# Patient Record
Sex: FEMALE | Race: BLACK OR AFRICAN AMERICAN | NOT HISPANIC OR LATINO | RURAL
[De-identification: names, ages, dates, MRNs, and addresses within clinical notes are randomized per-mention and may not be internally consistent; named-entity substitution may affect disease eponyms.]

---
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
## 2022-05-12 ENCOUNTER — HOSPITAL ENCOUNTER (EMERGENCY)
Facility: HOSPITAL | Age: 22
Discharge: HOME | End: 2022-05-12
Payer: MEDICAID

## 2022-05-12 VITALS
WEIGHT: 234 LBS | RESPIRATION RATE: 17 BRPM | TEMPERATURE: 98 F | BODY MASS INDEX: 36.73 KG/M2 | HEART RATE: 58 BPM | OXYGEN SATURATION: 98 % | DIASTOLIC BLOOD PRESSURE: 58 MMHG | SYSTOLIC BLOOD PRESSURE: 123 MMHG | HEIGHT: 67 IN

## 2022-05-12 DIAGNOSIS — H60.501 ACUTE OTITIS EXTERNA OF RIGHT EAR, UNSPECIFIED TYPE: Primary | ICD-10-CM

## 2022-05-12 PROCEDURE — 99284 EMERGENCY DEPT VISIT MOD MDM: CPT

## 2022-05-12 PROCEDURE — 99283 PR EMERGENCY DEPT VISIT,LEVEL III: ICD-10-PCS | Mod: ,,, | Performed by: PHYSICIAN ASSISTANT

## 2022-05-12 PROCEDURE — 25000003 PHARM REV CODE 250: Performed by: PHYSICIAN ASSISTANT

## 2022-05-12 PROCEDURE — 99283 EMERGENCY DEPT VISIT LOW MDM: CPT | Mod: ,,, | Performed by: PHYSICIAN ASSISTANT

## 2022-05-12 RX ORDER — NEOMYCIN SULFATE, POLYMYXIN B SULFATE AND HYDROCORTISONE 10; 3.5; 1 MG/ML; MG/ML; [USP'U]/ML
4 SUSPENSION/ DROPS AURICULAR (OTIC) 3 TIMES DAILY
Qty: 10 ML | Refills: 1 | Status: SHIPPED | OUTPATIENT
Start: 2022-05-12 | End: 2022-12-28

## 2022-05-12 RX ORDER — ACETAMINOPHEN 500 MG
1000 TABLET ORAL
Status: COMPLETED | OUTPATIENT
Start: 2022-05-12 | End: 2022-05-12

## 2022-05-12 RX ORDER — NEOMYCIN SULFATE, POLYMYXIN B SULFATE, HYDROCORTISONE 3.5; 10000; 1 MG/ML; [USP'U]/ML; MG/ML
4 SOLUTION/ DROPS AURICULAR (OTIC)
Status: COMPLETED | OUTPATIENT
Start: 2022-05-12 | End: 2022-05-12

## 2022-05-12 RX ADMIN — NEOMYCIN SULFATE, POLYMYXIN B SULFATE, HYDROCORTISONE 4 DROP: 3.5; 10000; 1 SOLUTION/ DROPS AURICULAR (OTIC) at 05:05

## 2022-05-12 RX ADMIN — ACETAMINOPHEN 1000 MG: 500 TABLET, FILM COATED ORAL at 05:05

## 2022-05-12 NOTE — ED PROVIDER NOTES
Encounter Date: 5/12/2022       History     Chief Complaint   Patient presents with    Otalgia    Foreign Body in Ear     Started 30 minutes ago.     21-year-old female with history of right ear pain, she also states that her ear ground her ear hurts as well.  She denies any spinning, but has been taking extended baths.  She states she maybe pregnant.        Review of patient's allergies indicates:   Allergen Reactions    Hornet venom Swelling     History reviewed. No pertinent past medical history.  Past Surgical History:   Procedure Laterality Date    KNEE SURGERY Right      History reviewed. No pertinent family history.  Social History     Tobacco Use    Smoking status: Former Smoker    Smokeless tobacco: Never Used   Substance Use Topics    Alcohol use: Not Currently    Drug use: Not Currently     Types: Marijuana     Review of Systems   HENT: Positive for ear pain.    All other systems reviewed and are negative.      Physical Exam     Initial Vitals [05/12/22 0447]   BP Pulse Resp Temp SpO2   (!) 123/58 (!) 58 17 98.2 °F (36.8 °C) 98 %      MAP       --         Physical Exam    Nursing note and vitals reviewed.  Constitutional:   Obese   HENT:   Head: Normocephalic and atraumatic.   Nose: Nose normal.   Mouth/Throat: Oropharynx is clear and moist.   Ear canal is erythemic with maceration of the canal, it is not closed.  She also has tenderness when pulling on the external ear, and with pressure on the tragus.   Eyes: EOM are normal.   Neck:   Normal range of motion.  Cardiovascular: Normal rate and regular rhythm.   Pulmonary/Chest: Breath sounds normal.   Musculoskeletal:      Cervical back: Normal range of motion.     Neurological: She is alert and oriented to person, place, and time.   Skin: Skin is dry.   Psychiatric: She has a normal mood and affect.         Medical Screening Exam   See Full Note    ED Course   Procedures  Labs Reviewed - No data to display       Imaging Results    None           Medications   acetaminophen tablet 1,000 mg (1,000 mg Oral Given 5/12/22 0525)   neomycin-polymyxin-hydrocortisone otic solution 4 drop (4 drops Right Ear Given 5/12/22 0525)                 ED Course as of 05/12/22 0534   Thu May 12, 2022   0526 Patient has otitis externa, will be given ear drops, and acetaminophen. [CB]      ED Course User Index  [CB] CHARLA Cage          Clinical Impression:   Final diagnoses:  [H60.501] Acute otitis externa of right ear, unspecified type (Primary)          ED Disposition Condition    Discharge Stable        ED Prescriptions     Medication Sig Dispense Start Date End Date Auth. Provider    neomycin-polymyxin-hydrocortisone (CORTISPORIN) 3.5-10,000-1 mg/mL-unit/mL-% otic suspension Place 4 drops into the right ear 3 (three) times daily. 10 mL 5/12/2022  CHARLA Cage        Follow-up Information    None          CHARLA Cage  05/12/22 0534

## 2022-05-12 NOTE — DISCHARGE INSTRUCTIONS
Return to the ER if any new or worsening symptoms arise.  If no better in 2-3 days follow-up with your primary care provider.

## 2022-05-12 NOTE — Clinical Note
"Alistair Bellajose a Wang was seen and treated in our emergency department on 5/12/2022.  She may return to school on 05/13/2022.      If you have any questions or concerns, please don't hesitate to call.      CHARLA Cage" Yes

## 2022-05-12 NOTE — ED NOTES
Instructed patient to instill 4 drops in right ear 3 times daily using same procedure that we use to instill drops in her ear in the ed. Have prescription filled in a few days when current bottle is about to run out. May take 1000 mg of tylenol every 4-6 hours as needed for pain or fever.  Do not put anything in ears smaller than your elbow! Follow-up with pcp in 2-3 days if not better. Return to ed for any new or worsening symptoms.  Patient verbalized understanding of all instructions.

## 2022-05-16 ENCOUNTER — TELEPHONE (OUTPATIENT)
Dept: EMERGENCY MEDICINE | Facility: HOSPITAL | Age: 22
End: 2022-05-16
Payer: MEDICAID

## 2022-12-28 ENCOUNTER — OFFICE VISIT (OUTPATIENT)
Dept: FAMILY MEDICINE | Facility: CLINIC | Age: 22
End: 2022-12-28
Payer: MEDICAID

## 2022-12-28 VITALS
RESPIRATION RATE: 18 BRPM | OXYGEN SATURATION: 99 % | HEART RATE: 55 BPM | BODY MASS INDEX: 36.51 KG/M2 | WEIGHT: 232.63 LBS | DIASTOLIC BLOOD PRESSURE: 68 MMHG | SYSTOLIC BLOOD PRESSURE: 104 MMHG | TEMPERATURE: 97 F | HEIGHT: 67 IN

## 2022-12-28 DIAGNOSIS — Z32.02 ENCOUNTER FOR PREGNANCY TEST, RESULT NEGATIVE: Primary | ICD-10-CM

## 2022-12-28 DIAGNOSIS — R11.0 NAUSEA: ICD-10-CM

## 2022-12-28 LAB
B-HCG UR QL: NEGATIVE
CTP QC/QA: YES

## 2022-12-28 PROCEDURE — 3074F PR MOST RECENT SYSTOLIC BLOOD PRESSURE < 130 MM HG: ICD-10-PCS | Mod: CPTII,,, | Performed by: NURSE PRACTITIONER

## 2022-12-28 PROCEDURE — 1160F PR REVIEW ALL MEDS BY PRESCRIBER/CLIN PHARMACIST DOCUMENTED: ICD-10-PCS | Mod: CPTII,,, | Performed by: NURSE PRACTITIONER

## 2022-12-28 PROCEDURE — 3078F DIAST BP <80 MM HG: CPT | Mod: CPTII,,, | Performed by: NURSE PRACTITIONER

## 2022-12-28 PROCEDURE — 99212 PR OFFICE/OUTPT VISIT, EST, LEVL II, 10-19 MIN: ICD-10-PCS | Mod: ,,, | Performed by: NURSE PRACTITIONER

## 2022-12-28 PROCEDURE — 3008F PR BODY MASS INDEX (BMI) DOCUMENTED: ICD-10-PCS | Mod: CPTII,,, | Performed by: NURSE PRACTITIONER

## 2022-12-28 PROCEDURE — 99212 OFFICE O/P EST SF 10 MIN: CPT | Mod: ,,, | Performed by: NURSE PRACTITIONER

## 2022-12-28 PROCEDURE — 3078F PR MOST RECENT DIASTOLIC BLOOD PRESSURE < 80 MM HG: ICD-10-PCS | Mod: CPTII,,, | Performed by: NURSE PRACTITIONER

## 2022-12-28 PROCEDURE — 1159F MED LIST DOCD IN RCRD: CPT | Mod: CPTII,,, | Performed by: NURSE PRACTITIONER

## 2022-12-28 PROCEDURE — 1160F RVW MEDS BY RX/DR IN RCRD: CPT | Mod: CPTII,,, | Performed by: NURSE PRACTITIONER

## 2022-12-28 PROCEDURE — 3074F SYST BP LT 130 MM HG: CPT | Mod: CPTII,,, | Performed by: NURSE PRACTITIONER

## 2022-12-28 PROCEDURE — 1159F PR MEDICATION LIST DOCUMENTED IN MEDICAL RECORD: ICD-10-PCS | Mod: CPTII,,, | Performed by: NURSE PRACTITIONER

## 2022-12-28 PROCEDURE — 81025 URINE PREGNANCY TEST: CPT | Mod: RHCUB | Performed by: NURSE PRACTITIONER

## 2022-12-28 PROCEDURE — 3008F BODY MASS INDEX DOCD: CPT | Mod: CPTII,,, | Performed by: NURSE PRACTITIONER

## 2022-12-28 NOTE — PROGRESS NOTES
"Subjective:       Patient ID: Alistair Wang is a 22 y.o. female.    Chief Complaint: pregnant test (Pt states that she thinks she is pregnant)    Presents to clinic as above. LMP 12/10/22. Has had sore breasts. She is not on birth control.    Review of Systems   Constitutional: Negative.    Respiratory: Negative.     Cardiovascular: Negative.    Gastrointestinal:  Positive for nausea. Negative for abdominal pain and vomiting.   Genitourinary: Negative.         Reviewed family, medical, surgical, and social history.    Objective:      /68 (BP Location: Left arm, Patient Position: Sitting, BP Method: Large (Manual))   Pulse (!) 55   Temp 97.3 °F (36.3 °C) (Oral)   Resp 18   Ht 5' 7" (1.702 m)   Wt 105.5 kg (232 lb 9.6 oz)   SpO2 99%   BMI 36.43 kg/m²   Physical Exam  Vitals and nursing note reviewed.   Constitutional:       General: She is not in acute distress.     Appearance: Normal appearance. She is normal weight. She is not ill-appearing, toxic-appearing or diaphoretic.   Cardiovascular:      Rate and Rhythm: Normal rate and regular rhythm.      Heart sounds: Normal heart sounds.   Pulmonary:      Effort: Pulmonary effort is normal.      Breath sounds: Normal breath sounds.   Skin:     General: Skin is warm and dry.   Neurological:      Mental Status: She is alert and oriented to person, place, and time.   Psychiatric:         Mood and Affect: Mood normal.         Behavior: Behavior normal.         Thought Content: Thought content normal.         Judgment: Judgment normal.          Office Visit on 12/28/2022   Component Date Value Ref Range Status    POC Preg Test, Ur 12/28/2022 Negative  Negative Final     Acceptable 12/28/2022 Yes   Final      Assessment:       1. Encounter for pregnancy test, result negative    2. Nausea          Plan:       Encounter for pregnancy test, result negative    Nausea  -     POCT urine pregnancy    Urine HCG negative  Repeat test if misses " menses  RTC PRN          Risks, benefits, and side effects were discussed with the patient. All questions were answered to the fullest satisfaction of the patient, and pt verbalized understanding and agreement to treatment plan. Pt was to call with any new or worsening symptoms, or present to the ER.

## 2023-04-14 ENCOUNTER — HOSPITAL ENCOUNTER (EMERGENCY)
Facility: HOSPITAL | Age: 23
Discharge: HOME | End: 2023-04-14
Payer: MEDICAID

## 2023-04-14 VITALS
HEART RATE: 59 BPM | HEIGHT: 65 IN | TEMPERATURE: 98 F | OXYGEN SATURATION: 100 % | BODY MASS INDEX: 39.04 KG/M2 | RESPIRATION RATE: 18 BRPM | DIASTOLIC BLOOD PRESSURE: 65 MMHG | WEIGHT: 234.31 LBS | SYSTOLIC BLOOD PRESSURE: 107 MMHG

## 2023-04-14 DIAGNOSIS — R10.9 ABDOMINAL CRAMPING: ICD-10-CM

## 2023-04-14 DIAGNOSIS — N93.9 VAGINAL BLEEDING: Primary | ICD-10-CM

## 2023-04-14 LAB
ALBUMIN SERPL BCP-MCNC: 3.4 G/DL (ref 3.5–5)
ALBUMIN/GLOB SERPL: 0.8 {RATIO}
ALP SERPL-CCNC: 64 U/L (ref 52–144)
ALT SERPL W P-5'-P-CCNC: 28 U/L (ref 13–56)
ANION GAP SERPL CALCULATED.3IONS-SCNC: 10 MMOL/L (ref 7–16)
AST SERPL W P-5'-P-CCNC: 22 U/L (ref 15–37)
BACTERIA #/AREA URNS HPF: ABNORMAL /HPF
BASOPHILS # BLD AUTO: 0.01 K/UL (ref 0–0.2)
BASOPHILS NFR BLD AUTO: 0.1 % (ref 0–1)
BILIRUB SERPL-MCNC: 0.2 MG/DL (ref ?–1.2)
BILIRUB UR QL STRIP: NEGATIVE
BUN SERPL-MCNC: 14 MG/DL (ref 7–18)
BUN/CREAT SERPL: 16 (ref 6–20)
CALCIUM SERPL-MCNC: 8.7 MG/DL (ref 8.5–10.1)
CHLORIDE SERPL-SCNC: 102 MMOL/L (ref 98–107)
CLARITY UR: CLEAR
CO2 SERPL-SCNC: 29 MMOL/L (ref 21–32)
COLOR UR: YELLOW
CREAT SERPL-MCNC: 0.9 MG/DL (ref 0.55–1.02)
DIFFERENTIAL METHOD BLD: ABNORMAL
EGFR (NO RACE VARIABLE) (RUSH/TITUS): 94 ML/MIN/1.73M²
EOSINOPHIL # BLD AUTO: 0.08 K/UL (ref 0–0.5)
EOSINOPHIL NFR BLD AUTO: 0.9 % (ref 1–4)
ERYTHROCYTE [DISTWIDTH] IN BLOOD BY AUTOMATED COUNT: 13.6 % (ref 11.5–14.5)
GLOBULIN SER-MCNC: 4.4 G/DL (ref 2–4)
GLUCOSE SERPL-MCNC: 102 MG/DL (ref 74–106)
GLUCOSE UR STRIP-MCNC: NEGATIVE MG/DL
HCG SERPL-ACNC: <1 MIU/ML
HCG UR QL IA.RAPID: NEGATIVE
HCT VFR BLD AUTO: 36.5 % (ref 38–47)
HGB BLD-MCNC: 11.9 G/DL (ref 12–16)
KETONES UR STRIP-SCNC: NEGATIVE MG/DL
LEUKOCYTE ESTERASE UR QL STRIP: NEGATIVE
LYMPHOCYTES # BLD AUTO: 3.17 K/UL (ref 1–4.8)
LYMPHOCYTES NFR BLD AUTO: 35.4 % (ref 27–41)
MCH RBC QN AUTO: 28.7 PG (ref 27–31)
MCHC RBC AUTO-ENTMCNC: 32.6 G/DL (ref 32–36)
MCV RBC AUTO: 88.2 FL (ref 80–96)
MONOCYTES # BLD AUTO: 0.54 K/UL (ref 0–0.8)
MONOCYTES NFR BLD AUTO: 6 % (ref 2–6)
MPC BLD CALC-MCNC: 9.9 FL (ref 9.4–12.4)
NEUTROPHILS # BLD AUTO: 5.16 K/UL (ref 1.8–7.7)
NEUTROPHILS NFR BLD AUTO: 57.6 % (ref 53–65)
NITRITE UR QL STRIP: NEGATIVE
PH UR STRIP: 6 PH UNITS
PLATELET # BLD AUTO: 306 K/UL (ref 150–400)
POTASSIUM SERPL-SCNC: 3.8 MMOL/L (ref 3.5–5.1)
PROT SERPL-MCNC: 7.8 G/DL (ref 6.4–8.2)
PROT UR QL STRIP: NEGATIVE
RBC # BLD AUTO: 4.14 M/UL (ref 4.2–5.4)
RBC # UR STRIP: ABNORMAL /UL
RBC #/AREA URNS HPF: ABNORMAL /HPF
SODIUM SERPL-SCNC: 137 MMOL/L (ref 136–145)
SP GR UR STRIP: 1.02
SQUAMOUS #/AREA URNS LPF: ABNORMAL /LPF
UROBILINOGEN UR STRIP-ACNC: 0.2 MG/DL
WBC # BLD AUTO: 8.96 K/UL (ref 4.5–11)
WBC #/AREA URNS HPF: ABNORMAL /HPF

## 2023-04-14 PROCEDURE — 99283 EMERGENCY DEPT VISIT LOW MDM: CPT | Mod: ,,, | Performed by: NURSE PRACTITIONER

## 2023-04-14 PROCEDURE — 81001 URINALYSIS AUTO W/SCOPE: CPT | Performed by: NURSE PRACTITIONER

## 2023-04-14 PROCEDURE — 85025 COMPLETE CBC W/AUTO DIFF WBC: CPT | Performed by: NURSE PRACTITIONER

## 2023-04-14 PROCEDURE — 80053 COMPREHEN METABOLIC PANEL: CPT | Performed by: NURSE PRACTITIONER

## 2023-04-14 PROCEDURE — 99283 PR EMERGENCY DEPT VISIT,LEVEL III: ICD-10-PCS | Mod: ,,, | Performed by: NURSE PRACTITIONER

## 2023-04-14 PROCEDURE — 84702 CHORIONIC GONADOTROPIN TEST: CPT | Performed by: NURSE PRACTITIONER

## 2023-04-14 PROCEDURE — 25000003 PHARM REV CODE 250: Performed by: NURSE PRACTITIONER

## 2023-04-14 PROCEDURE — 81025 URINE PREGNANCY TEST: CPT | Performed by: NURSE PRACTITIONER

## 2023-04-14 PROCEDURE — 99283 EMERGENCY DEPT VISIT LOW MDM: CPT

## 2023-04-14 RX ORDER — ACETAMINOPHEN 500 MG
1000 TABLET ORAL
Status: COMPLETED | OUTPATIENT
Start: 2023-04-14 | End: 2023-04-14

## 2023-04-14 RX ADMIN — ACETAMINOPHEN 1000 MG: 500 TABLET ORAL at 10:04

## 2023-04-14 NOTE — ED PROVIDER NOTES
"Encounter Date: 2023       History     Chief Complaint   Patient presents with    Abdominal Pain     Pain all over, all started yest, scant amt vaginal bleeding, not heavy and not wearing a pad but has slight blood in panties that is dark, last known cycle was on 12-    Back Pain     Lower middle     Alistair Wang is a 22 y.o. Black or  /female presenting to ED with diffuse abd pain and lower abd cramping with vaginal "spotting" for 10 hours. Pain started in lower abd last night while cleaning house. She states that pain is worse when she lies down and at rest. Pain improved with movement. She has not taken anything for pain PTA. She reports to be approx 4 months pregnant verified with home pregnancy test a few months ago. LMP 12/10/22. She is a A2. Last 2 pregnancies she reports she lost around 4 months. She has not had prenatal care with this pregnancy or prior 2 pregnancies due to transportation difficulties. Currently in [place]. VSS at this time. Significant other at bedside.    The history is provided by the patient.   Review of patient's allergies indicates:   Allergen Reactions    Hornet venom Swelling     History reviewed. No pertinent past medical history.  Past Surgical History:   Procedure Laterality Date    KNEE SURGERY Right      History reviewed. No pertinent family history.  Social History     Tobacco Use    Smoking status: Former    Smokeless tobacco: Never   Substance Use Topics    Alcohol use: Not Currently    Drug use: Yes     Types: Marijuana     Comment: last use was on 3 days ago     Review of Systems   Constitutional:  Negative for activity change, appetite change, chills, fatigue and fever.   Respiratory:  Negative for cough and shortness of breath.    Cardiovascular:  Negative for chest pain and palpitations.   Gastrointestinal:  Positive for abdominal pain (cramping). Negative for constipation, diarrhea, nausea and vomiting.   Genitourinary:  Positive for " vaginal bleeding. Negative for dysuria, frequency, urgency, vaginal discharge and vaginal pain.   Neurological:  Negative for dizziness, weakness and light-headedness.   All other systems reviewed and are negative.    Physical Exam     Initial Vitals [04/14/23 0935]   BP Pulse Resp Temp SpO2   107/65 (!) 59 18 97.7 °F (36.5 °C) 100 %      MAP       --         Physical Exam    Nursing note and vitals reviewed.  Constitutional: She appears well-developed and well-nourished. She is Obese . No distress.   HENT:   Mouth/Throat: Oropharynx is clear and moist.   Eyes: Conjunctivae are normal. Pupils are equal, round, and reactive to light. No scleral icterus.   Neck: Neck supple.   Normal range of motion.  Cardiovascular:  Normal rate, regular rhythm, normal heart sounds and intact distal pulses.           Pulmonary/Chest: Breath sounds normal. No respiratory distress.   Abdominal: Abdomen is soft. Bowel sounds are normal. There is abdominal tenderness (mild) in the suprapubic area. No hernia. Hernia confirmed negative in the right inguinal area and confirmed negative in the left inguinal area.   No right CVA tenderness.  No left CVA tenderness. There is no rebound, no guarding, no tenderness at McBurney's point and negative Reveles's sign.   Genitourinary:    Uterus and rectum normal.      Pelvic exam was performed with patient supine.   There is no rash, tenderness or lesion on the right labia. There is no rash, tenderness or lesion on the left labia. Cervix exhibits no motion tenderness. Right adnexum displays no mass, no tenderness and no fullness. Left adnexum displays no mass, no tenderness and no fullness.    Vaginal bleeding (moderated dark blood with few small clots) present.   There is bleeding (moderated dark blood with few small clots) in the vagina.    Genitourinary Comments: Cervical os open     Musculoskeletal:         General: No tenderness. Normal range of motion.      Cervical back: Normal range of motion  and neck supple.     Lymphadenopathy: No inguinal adenopathy noted on the right or left side.   Neurological: She is alert and oriented to person, place, and time. She has normal strength. GCS score is 15. GCS eye subscore is 4. GCS verbal subscore is 5. GCS motor subscore is 6.   Skin: Skin is warm and dry. Capillary refill takes less than 2 seconds.       Medical Screening Exam   See Full Note    ED Course   Procedures  Labs Reviewed   URINALYSIS, REFLEX TO URINE CULTURE - Abnormal; Notable for the following components:       Result Value    Blood, UA Large (*)     All other components within normal limits   COMPREHENSIVE METABOLIC PANEL - Abnormal; Notable for the following components:    Albumin 3.4 (*)     Globulin 4.4 (*)     All other components within normal limits   CBC WITH DIFFERENTIAL - Abnormal; Notable for the following components:    RBC 4.14 (*)     Hemoglobin 11.9 (*)     Hematocrit 36.5 (*)     Eosinophils % 0.9 (*)     All other components within normal limits   URINALYSIS, MICROSCOPIC - Abnormal; Notable for the following components:    RBC, UA 15-25 (*)     All other components within normal limits   HCG QUALITATIVE URINE - Normal   CBC W/ AUTO DIFFERENTIAL    Narrative:     The following orders were created for panel order CBC auto differential.  Procedure                               Abnormality         Status                     ---------                               -----------         ------                     CBC with Differential[[id number]]        Abnormal            Final result                 Please view results for these tests on the individual orders.   HCG, TOTAL, QUANTITATIVE          Imaging Results    None          Medications   acetaminophen tablet 1,000 mg (1,000 mg Oral Given 4/14/23 1053)     Medical Decision Making:   Initial Assessment:   Alistair Wang is a 22 y.o. Black or  /female presenting to ED with diffuse abd pain and lower abd cramping with  "vaginal "spotting" for 10 hours. Pain started in lower abd last night while cleaning house. She states that pain is worse when she lies down and at rest. Pain improved with movement. She has not taken anything for pain PTA. She reports to be approx 4 months pregnant verified with home pregnancy test a few months ago. LMP 12/10/22. She is a A2. Last 2 pregnancies she reports she lost around 4 months. She has not had prenatal care with this pregnancy or prior 2 pregnancies due to transportation difficulties. Currently in [facility]. VSS at this time. Significant other at bedside.  Differential Diagnosis:   Threatened   Second trimester pregnancy  UTI    Clinical Tests:   Lab Tests: Ordered and Reviewed       <> Summary of Lab: CBC- RBC 4.14, Hgb 11.9, Hct 36.3  CMP- unremarkable  UA- large occult blood  Urine preg- negative  Quant- send out  ED Management:  Speculum exam  Tylenol 1 gm PO- pain improved  Given the large differential diagnosis for Alistair Wang, the decision making in this case is of high complexity.    After evaluating all of the data points in this case, the presentation of Alistair Wang is NOT consistent with AAA; Mesenteric Ischemia; Bowel Perforation; Bowel Obstruction; Sigmoid Volvulus; Diverticulitis; Appendicitis; Peritonitis; Cholecystitis, ascending cholangitis or other gallbladder disease; perforated ulcer; significant GI bleeding, splenic rupture/infarction; Hepatic abscess; or other surgical/acute abdomen.    Similarly, this presentation is NOT consistent with ACS or Myocardial Ischemia or cardiac etiology; Pulmonary Embolism; fistula; incarcerated hernia; Pancreatitis, Aortic Dissection; Diabetic Ketoacidosis; Kidney Stone; Ischemic colitis; Psoas or other abscess; Methanol poisoning; Heavy metal toxicity; or porphyria.    Similarly, this case is NOT consistent with Oykv-Zkcz-Ouhbns Syndrome, Ectopic Pregnancy, Placental Abruption, PID, Tubo-ovarian abscess, Ovarian Torsion, " or STI.    Similarly, this presentation is NOT consistent with acute coronary syndrome, pulmonary embolism, dissection, borhaave's, arrythmia, pneumothorax, cardiac tamponade, or other emergent cardiopulmonary condition.    Similarly, this presentation is NOT consistent with sepsis, pyelonephritis, urinary infection, pneumonia, or other focal bacterial infection.    Strict return and follow-up precautions have been given by me personally to the patient/family/caregiver(s).    Data Reviewed/Counseling: I have reviewed the patient's vital signs, nursing notes, and other relevant tests/information. I had a detailed discussion regarding the historical points, exam findings, and any diagnostic results supporting the discharge diagnosis. I also discussed the need for outpatient follow-up and the need to return to the ED if symptoms worsen or if there are any questions or concerns that arise at home.     Dx: Vaginal bleeding, abdominal cramping             ED Course as of 04/14/23 1110 Fri Apr 14, 2023   1059 Pain improved. Discussed results. Patient is in agreement with plan to d/c home. V/u of all discharge instructions. No questions or concerns at this time. [LP]      ED Course User Index  [LP] MOHAMUD Hale          Clinical Impression:   Final diagnoses:  [N93.9] Vaginal bleeding (Primary)  [R10.9] Abdominal cramping        ED Disposition Condition    Discharge Stable          ED Prescriptions    None       Follow-up Information    None          MOHAMUD Hale  04/14/23 1110       MOHAMUD Hale  04/14/23 1110

## 2023-04-14 NOTE — DISCHARGE INSTRUCTIONS
Follow up with OBGYN of your choice in the next 2-3 days.   Follow up with primary care provider in 2-3 days.   Increase water intake.   Keep a pad count and if you fill more than 4 pads per hour, notify GYN and return to emergency department.  Motrin 400 mg every 6-8 hours as needed for pain/discomfort.  Tylenol 500mg every 4-6 hours as needed for pain/discomfort.  Return to emergency department for any new or worsening symptoms or for any future emergencies.

## 2023-04-14 NOTE — ED TRIAGE NOTES
Rcd this pt to ed1 and she states she has abd pain and lower back pain, thru triage this pt is very hesitant to answer my questions and eventually my questions lead to her stating her LMP was on 12-10-22 and that she was pregnant, that she has not seen and OBGYN as of today but plans to make appt in [place] but does not tell me who, does not volunteer any information I have to ask her repeatedly and she hesitates, I ask if I need to ask her visitor to leave and she states no, states she has been pregnant 3 times including this one and has no living children, when asked if she had miscarriages she just shrugs her shoulders

## 2023-07-12 ENCOUNTER — HOSPITAL ENCOUNTER (EMERGENCY)
Facility: HOSPITAL | Age: 23
Discharge: HOME | End: 2023-07-12
Payer: MEDICAID

## 2023-07-12 VITALS
SYSTOLIC BLOOD PRESSURE: 101 MMHG | OXYGEN SATURATION: 98 % | HEART RATE: 55 BPM | BODY MASS INDEX: 36.68 KG/M2 | RESPIRATION RATE: 18 BRPM | TEMPERATURE: 98 F | DIASTOLIC BLOOD PRESSURE: 51 MMHG | HEIGHT: 68 IN | WEIGHT: 242 LBS

## 2023-07-12 DIAGNOSIS — N94.6 DYSMENORRHEA: Primary | ICD-10-CM

## 2023-07-12 DIAGNOSIS — N92.6 IRREGULAR MENSES: ICD-10-CM

## 2023-07-12 LAB
BACTERIA #/AREA URNS HPF: ABNORMAL /HPF
BILIRUB UR QL STRIP: NEGATIVE
CLARITY UR: CLEAR
COLOR UR: YELLOW
GLUCOSE UR STRIP-MCNC: NEGATIVE MG/DL
HCG UR QL IA.RAPID: NEGATIVE
KETONES UR STRIP-SCNC: NEGATIVE MG/DL
LEUKOCYTE ESTERASE UR QL STRIP: NEGATIVE
NITRITE UR QL STRIP: NEGATIVE
PH UR STRIP: 6.5 PH UNITS
PROT UR QL STRIP: NEGATIVE
RBC # UR STRIP: ABNORMAL /UL
RBC #/AREA URNS HPF: ABNORMAL /HPF
SP GR UR STRIP: 1.02
SQUAMOUS #/AREA URNS LPF: ABNORMAL /LPF
UROBILINOGEN UR STRIP-ACNC: 1 MG/DL
WBC #/AREA URNS HPF: ABNORMAL /HPF

## 2023-07-12 PROCEDURE — 99282 EMERGENCY DEPT VISIT SF MDM: CPT

## 2023-07-12 PROCEDURE — 25000003 PHARM REV CODE 250: Performed by: NURSE PRACTITIONER

## 2023-07-12 PROCEDURE — 81025 URINE PREGNANCY TEST: CPT | Performed by: NURSE PRACTITIONER

## 2023-07-12 PROCEDURE — 99283 EMERGENCY DEPT VISIT LOW MDM: CPT | Mod: ,,, | Performed by: NURSE PRACTITIONER

## 2023-07-12 PROCEDURE — 81001 URINALYSIS AUTO W/SCOPE: CPT | Performed by: NURSE PRACTITIONER

## 2023-07-12 PROCEDURE — 99283 PR EMERGENCY DEPT VISIT,LEVEL III: ICD-10-PCS | Mod: ,,, | Performed by: NURSE PRACTITIONER

## 2023-07-12 RX ORDER — IBUPROFEN 200 MG
600 TABLET ORAL
Status: COMPLETED | OUTPATIENT
Start: 2023-07-12 | End: 2023-07-12

## 2023-07-12 RX ORDER — KETOROLAC TROMETHAMINE 30 MG/ML
15 INJECTION, SOLUTION INTRAMUSCULAR; INTRAVENOUS
Status: DISCONTINUED | OUTPATIENT
Start: 2023-07-12 | End: 2023-07-12

## 2023-07-12 RX ADMIN — IBUPROFEN 600 MG: 200 TABLET, FILM COATED ORAL at 02:07

## 2023-07-12 NOTE — ED PROVIDER NOTES
"Encounter Date: 7/12/2023       History     Chief Complaint   Patient presents with    Flank Pain     Bilateral flank    Generalized Body Aches     4 days    Nausea     Ms Wang is a 22 yr old AAF with no significant PMH to ED with c/o bilateral lower back pain, burning with urination, aching to joints for the past few days.  States has irregular periods and hasn't had in months.  When asked about pregnancy, pt states is unsure.      The history is provided by the patient.   Back Pain   The current episode started several days ago. The pain is associated with no known injury. Associated symptoms include dysuria. Pertinent negatives include no fever, no abdominal pain, no abdominal swelling, no bowel incontinence, no perianal numbness, no paresthesias, no paresis, no tingling and no weakness.   Review of patient's allergies indicates:   Allergen Reactions    Hornet venom Swelling     History reviewed. No pertinent past medical history.  Past Surgical History:   Procedure Laterality Date    KNEE SURGERY Right      History reviewed. No pertinent family history.  Social History     Tobacco Use    Smoking status: Former    Smokeless tobacco: Never   Substance Use Topics    Alcohol use: Not Currently    Drug use: Not Currently     Types: Marijuana     Comment: like "week and a half ago"     Review of Systems   Constitutional:  Negative for fever.   Respiratory: Negative.     Cardiovascular: Negative.    Gastrointestinal:  Negative for abdominal pain and bowel incontinence.   Genitourinary:  Positive for dysuria.   Musculoskeletal:  Positive for back pain.   Skin: Negative.    Neurological:  Negative for tingling, weakness and paresthesias.     Physical Exam     Initial Vitals [07/12/23 1339]   BP Pulse Resp Temp SpO2   (!) 101/51 (!) 55 18 98.2 °F (36.8 °C) 98 %      MAP       --         Physical Exam    Nursing note and vitals reviewed.  Constitutional: She appears well-developed and well-nourished.   Cardiovascular:  " Normal rate and regular rhythm.           Pulmonary/Chest: Breath sounds normal.   Abdominal: Abdomen is soft. There is no abdominal tenderness.     Neurological: She is alert and oriented to person, place, and time.   Skin: Skin is warm and dry.       Medical Screening Exam   See Full Note    ED Course   Procedures  Labs Reviewed   URINALYSIS - Abnormal; Notable for the following components:       Result Value    Blood, UA Moderate (*)     All other components within normal limits   URINALYSIS, MICROSCOPIC - Abnormal; Notable for the following components:    RBC, UA 10-15 (*)     Squamous Epithelial Cells, UA Few (*)     All other components within normal limits   HCG QUALITATIVE URINE - Normal          Imaging Results    None          Medications   ibuprofen tablet 600 mg (600 mg Oral Given 7/12/23 1440)     Medical Decision Making:   Initial Assessment:   Ms Wang is a 22 yr old AAF with no significant PMH to ED with c/o lower back pain, burning with urination, aching to joints for the past 4 days.    Differential Diagnosis:   UTI  Menstrual cramps      Clinical Tests:   Lab Tests: Ordered and Reviewed  The following lab test(s) were unremarkable: Urinalysis and UPT       <> Summary of Lab: No significant findings  ED Management:  Offered toradol for pain - pt declined.    Ibuprofen given for pain.  Discussed need to see Gyn for irregular periods.  Take ibuprofen or tylenol as directed for aching. Return for worsening condition.            ED Course as of 07/20/23 0658   Wed Jul 12, 2023   1426 Pt declines toradol.  She request ibuprofen insteat.   [CG]   1426 Occult Blood UA(!): Moderate  States noticed light vaginal bleeding this morning.  [CG]      ED Course User Index  [CG] MOHAMUD Finley                Clinical Impression:   Final diagnoses:  [N94.6] Dysmenorrhea (Primary)  [N92.6] Irregular menses        ED Disposition Condition    Discharge Stable          ED Prescriptions    None       Follow-up  Information       Follow up With Specialties Details Why Contact Info    Waylon Del Rosario MD Obstetrics and Gynecology Schedule an appointment as soon as possible for a visit   [address] [place] [id number]  [phone]               Daisy Duarte, MOHAMUD  07/12/23 1443       Daisy Duarte, MOHAMUD  07/20/23 0659

## 2023-07-12 NOTE — DISCHARGE INSTRUCTIONS
Call gyn for appointment to have irregular periods evaluated.  May take tylenol or ibuprofen as directed if needed for pain.  Return for worsening condition .

## 2023-07-24 ENCOUNTER — HOSPITAL ENCOUNTER (EMERGENCY)
Facility: HOSPITAL | Age: 23
Discharge: HOME | End: 2023-07-24
Payer: MEDICAID

## 2023-07-24 VITALS
WEIGHT: 242 LBS | BODY MASS INDEX: 36.68 KG/M2 | SYSTOLIC BLOOD PRESSURE: 129 MMHG | RESPIRATION RATE: 18 BRPM | HEIGHT: 68 IN | OXYGEN SATURATION: 100 % | TEMPERATURE: 98 F | HEART RATE: 68 BPM | DIASTOLIC BLOOD PRESSURE: 60 MMHG

## 2023-07-24 DIAGNOSIS — N91.2 AMENORRHEA: ICD-10-CM

## 2023-07-24 DIAGNOSIS — R10.9 ABDOMINAL PAIN, UNSPECIFIED ABDOMINAL LOCATION: Primary | ICD-10-CM

## 2023-07-24 LAB
ALBUMIN SERPL BCP-MCNC: 3.5 G/DL (ref 3.5–5)
ALBUMIN/GLOB SERPL: 0.7 {RATIO}
ALP SERPL-CCNC: 62 U/L (ref 52–144)
ALT SERPL W P-5'-P-CCNC: 28 U/L (ref 13–56)
ANION GAP SERPL CALCULATED.3IONS-SCNC: 17 MMOL/L (ref 7–16)
AST SERPL W P-5'-P-CCNC: 29 U/L (ref 15–37)
BACTERIA #/AREA URNS HPF: ABNORMAL /HPF
BASOPHILS # BLD AUTO: 0.03 K/UL (ref 0–0.2)
BASOPHILS NFR BLD AUTO: 0.3 % (ref 0–1)
BILIRUB SERPL-MCNC: 0.4 MG/DL (ref ?–1.2)
BILIRUB UR QL STRIP: NEGATIVE
BUN SERPL-MCNC: 4 MG/DL (ref 7–18)
BUN/CREAT SERPL: 4 (ref 6–20)
CALCIUM SERPL-MCNC: 9.6 MG/DL (ref 8.5–10.1)
CHLORIDE SERPL-SCNC: 102 MMOL/L (ref 98–107)
CLARITY UR: CLEAR
CO2 SERPL-SCNC: 27 MMOL/L (ref 21–32)
COLOR UR: YELLOW
CREAT SERPL-MCNC: 0.9 MG/DL (ref 0.55–1.02)
DIFFERENTIAL METHOD BLD: ABNORMAL
EGFR (NO RACE VARIABLE) (RUSH/TITUS): 94 ML/MIN/1.73M2
EOSINOPHIL # BLD AUTO: 0.07 K/UL (ref 0–0.5)
EOSINOPHIL NFR BLD AUTO: 0.6 % (ref 1–4)
ERYTHROCYTE [DISTWIDTH] IN BLOOD BY AUTOMATED COUNT: 13.6 % (ref 11.5–14.5)
GLOBULIN SER-MCNC: 4.7 G/DL (ref 2–4)
GLUCOSE SERPL-MCNC: 104 MG/DL (ref 74–106)
GLUCOSE UR STRIP-MCNC: NEGATIVE MG/DL
HCG UR QL IA.RAPID: NEGATIVE
HCT VFR BLD AUTO: 39.2 % (ref 38–47)
HGB BLD-MCNC: 12.7 G/DL (ref 12–16)
KETONES UR STRIP-SCNC: NEGATIVE MG/DL
LEUKOCYTE ESTERASE UR QL STRIP: NEGATIVE
LYMPHOCYTES # BLD AUTO: 3.11 K/UL (ref 1–4.8)
LYMPHOCYTES NFR BLD AUTO: 28.5 % (ref 27–41)
MCH RBC QN AUTO: 28.3 PG (ref 27–31)
MCHC RBC AUTO-ENTMCNC: 32.4 G/DL (ref 32–36)
MCV RBC AUTO: 87.5 FL (ref 80–96)
MONOCYTES # BLD AUTO: 0.63 K/UL (ref 0–0.8)
MONOCYTES NFR BLD AUTO: 5.8 % (ref 2–6)
MPC BLD CALC-MCNC: 9.8 FL (ref 9.4–12.4)
NEUTROPHILS # BLD AUTO: 7.06 K/UL (ref 1.8–7.7)
NEUTROPHILS NFR BLD AUTO: 64.8 % (ref 53–65)
NITRITE UR QL STRIP: NEGATIVE
PH UR STRIP: 7 PH UNITS
PLATELET # BLD AUTO: 322 K/UL (ref 150–400)
POTASSIUM SERPL-SCNC: 4.6 MMOL/L (ref 3.5–5.1)
PROT SERPL-MCNC: 8.2 G/DL (ref 6.4–8.2)
PROT UR QL STRIP: NEGATIVE
RBC # BLD AUTO: 4.48 M/UL (ref 4.2–5.4)
RBC # UR STRIP: ABNORMAL /UL
RBC #/AREA URNS HPF: ABNORMAL /HPF
SODIUM SERPL-SCNC: 141 MMOL/L (ref 136–145)
SP GR UR STRIP: 1.01
SQUAMOUS #/AREA URNS LPF: ABNORMAL /LPF
UROBILINOGEN UR STRIP-ACNC: 0.2 MG/DL
WBC # BLD AUTO: 10.9 K/UL (ref 4.5–11)
WBC #/AREA URNS HPF: ABNORMAL /HPF

## 2023-07-24 PROCEDURE — 81025 URINE PREGNANCY TEST: CPT | Performed by: PHYSICIAN ASSISTANT

## 2023-07-24 PROCEDURE — 80053 COMPREHEN METABOLIC PANEL: CPT | Performed by: PHYSICIAN ASSISTANT

## 2023-07-24 PROCEDURE — 85025 COMPLETE CBC W/AUTO DIFF WBC: CPT | Performed by: PHYSICIAN ASSISTANT

## 2023-07-24 PROCEDURE — 99283 EMERGENCY DEPT VISIT LOW MDM: CPT

## 2023-07-24 PROCEDURE — 99284 EMERGENCY DEPT VISIT MOD MDM: CPT | Mod: ,,, | Performed by: PHYSICIAN ASSISTANT

## 2023-07-24 PROCEDURE — 81001 URINALYSIS AUTO W/SCOPE: CPT | Performed by: PHYSICIAN ASSISTANT

## 2023-07-24 PROCEDURE — 99284 PR EMERGENCY DEPT VISIT,LEVEL IV: ICD-10-PCS | Mod: ,,, | Performed by: PHYSICIAN ASSISTANT

## 2023-07-24 NOTE — ED NOTES
Staff spoke to pt in detail of the importance of obtaining an appointment with a OB-Gyn and having a check up and follow up with her missed periods. Family at bedside states she made pt an appointment. Staff spoke with pt in detail regarding illusions of being 38 weeks pregnant and also discussed that pt need to seek further treatment if symptoms of pregnancy exist with vu.

## 2023-07-24 NOTE — ED PROVIDER NOTES
"Encounter Date: 7/24/2023       History     Chief Complaint   Patient presents with    Abdominal Pain    Back Pain     Patient is a 22-year-old female who says she is 38 weeks pregnant, she had a negative pregnancy test 2 weeks ago, and her test today was negative.    She states that her abdomen is causing her discomfort pointing all over her abdomen.    She denies any nausea, vomiting, diarrhea.    She states her last menstrual cycle was in December of last year.    She has no significant past medical history.    Past surgical history is right knee surgery  She denies any tobacco, alcohol however does smoke marijuana a week and a half ago.    Review of patient's allergies indicates:   Allergen Reactions    Hornet venom Swelling     History reviewed. No pertinent past medical history.  Past Surgical History:   Procedure Laterality Date    KNEE SURGERY Right      History reviewed. No pertinent family history.  Social History     Tobacco Use    Smoking status: Former    Smokeless tobacco: Never   Substance Use Topics    Alcohol use: Not Currently    Drug use: Not Currently     Types: Marijuana     Comment: like "week and a half ago"     Review of Systems   Gastrointestinal:  Positive for abdominal pain.   All other systems reviewed and are negative.    Physical Exam     Initial Vitals [07/24/23 1004]   BP Pulse Resp Temp SpO2   129/60 68 18 98.4 °F (36.9 °C) 100 %      MAP       --         Physical Exam    Nursing note and vitals reviewed.  Constitutional: No distress.   Obese   HENT:   Head: Atraumatic.   Eyes: EOM are normal.   Neck: Neck supple.   Cardiovascular:  Normal rate, regular rhythm and normal heart sounds.           Pulmonary/Chest: Breath sounds normal.   Abdominal: Abdomen is soft. Bowel sounds are normal.   Musculoskeletal:      Cervical back: Neck supple.     Neurological: She is alert and oriented to person, place, and time.   Skin: Skin is dry.   Psychiatric: She has a normal mood and affect. "       Medical Screening Exam   See Full Note    ED Course   Procedures  Labs Reviewed   COMPREHENSIVE METABOLIC PANEL - Abnormal; Notable for the following components:       Result Value    Anion Gap 17 (*)     BUN 4 (*)     BUN/Creatinine Ratio 4 (*)     Globulin 4.7 (*)     All other components within normal limits   URINALYSIS, REFLEX TO URINE CULTURE - Abnormal; Notable for the following components:    Blood, UA Small (*)     All other components within normal limits   CBC WITH DIFFERENTIAL - Abnormal; Notable for the following components:    Eosinophils % 0.6 (*)     All other components within normal limits   URINALYSIS, MICROSCOPIC - Abnormal; Notable for the following components:    Bacteria, UA Few (*)     Squamous Epithelial Cells, UA Few (*)     All other components within normal limits   HCG QUALITATIVE URINE - Normal   CBC W/ AUTO DIFFERENTIAL    Narrative:     The following orders were created for panel order CBC auto differential.  Procedure                               Abnormality         Status                     ---------                               -----------         ------                     CBC with Differential[[id number]]        Abnormal            Final result                 Please view results for these tests on the individual orders.          Imaging Results    None          Medications - No data to display  Medical Decision Making:   Initial Assessment:   Patient is a 22-year-old female who says she is 38 weeks pregnant, she had a negative pregnancy test 2 weeks ago, and her test today was negative.    She states that her abdomen is causing her discomfort pointing all over her abdomen.    She denies any nausea, vomiting, diarrhea.    She states her last menstrual cycle was in December of last year.    She has no significant past medical history.    Past surgical history is right knee surgery  She denies any tobacco, alcohol however does smoke marijuana a week and a half  ago.  Differential Diagnosis:   Abdominal pain, dysmenorrhea, no pregnancy  ED Management:  Patient has been seen several times saying that she is pregnant, but has a negative pregnancy test, she was scheduled to see OBGYN, but did not keep scheduled appointment.    She will call and get another appointment.    She states she is feeling fine now, and wants to go home.    We discussed there may be a psych issue with feeling like she is pregnant even though she is had several negative pregnancy tests.    She is not interested in getting any counseling at this time.                             Clinical Impression:   Final diagnoses:  [R10.9] Abdominal pain, unspecified abdominal location (Primary)  [N91.2] Amenorrhea        ED Disposition Condition    Discharge Stable          ED Prescriptions    None       Follow-up Information    None          CHARLA Cage  07/24/23 1232

## 2024-04-19 ENCOUNTER — HOSPITAL ENCOUNTER (EMERGENCY)
Facility: HOSPITAL | Age: 24
Discharge: HOME | End: 2024-04-19

## 2024-04-19 VITALS
BODY MASS INDEX: 35.77 KG/M2 | RESPIRATION RATE: 18 BRPM | HEIGHT: 68 IN | HEART RATE: 73 BPM | OXYGEN SATURATION: 98 % | WEIGHT: 236 LBS | DIASTOLIC BLOOD PRESSURE: 60 MMHG | TEMPERATURE: 99 F | SYSTOLIC BLOOD PRESSURE: 124 MMHG

## 2024-04-19 DIAGNOSIS — J10.1 INFLUENZA A: Primary | ICD-10-CM

## 2024-04-19 LAB
FLUAV AG UPPER RESP QL IA.RAPID: POSITIVE
FLUBV AG UPPER RESP QL IA.RAPID: NEGATIVE
GROUP A STREP MOLECULAR (OHS): NEGATIVE
SARS-COV+SARS-COV-2 AG RESP QL IA.RAPID: NEGATIVE

## 2024-04-19 PROCEDURE — 25000003 PHARM REV CODE 250

## 2024-04-19 PROCEDURE — 99284 EMERGENCY DEPT VISIT MOD MDM: CPT | Mod: ,,,

## 2024-04-19 PROCEDURE — 99283 EMERGENCY DEPT VISIT LOW MDM: CPT

## 2024-04-19 PROCEDURE — 87651 STREP A DNA AMP PROBE: CPT

## 2024-04-19 PROCEDURE — 87428 SARSCOV & INF VIR A&B AG IA: CPT

## 2024-04-19 RX ORDER — IBUPROFEN 200 MG
600 TABLET ORAL
Status: COMPLETED | OUTPATIENT
Start: 2024-04-19 | End: 2024-04-19

## 2024-04-19 RX ORDER — ONDANSETRON 4 MG/1
4 TABLET, ORALLY DISINTEGRATING ORAL
Status: COMPLETED | OUTPATIENT
Start: 2024-04-19 | End: 2024-04-19

## 2024-04-19 RX ADMIN — ONDANSETRON 4 MG: 4 TABLET, ORALLY DISINTEGRATING ORAL at 05:04

## 2024-04-19 RX ADMIN — IBUPROFEN 600 MG: 200 TABLET, FILM COATED ORAL at 05:04

## 2024-04-19 NOTE — ED PROVIDER NOTES
"Encounter Date: 4/19/2024       History     Chief Complaint   Patient presents with    Vomiting     Pt complaining of N/V, diarrhea, fever, cough, runny nose, sore throat\, and body aches since this am     23-year old American or black female complaining of N/V, diarrhea, fever, cough, runny nose, sore throat, and body aches since this am. Oropharynx red and moist with no exudates. Denies SOB, chest pain, or abd pain. Vitals stable with no acute distress.    The history is provided by the patient.     Review of patient's allergies indicates:   Allergen Reactions    Hornet venom Swelling     No past medical history on file.  Past Surgical History:   Procedure Laterality Date    KNEE SURGERY Right      No family history on file.  Social History     Tobacco Use    Smoking status: Former    Smokeless tobacco: Never   Substance Use Topics    Alcohol use: Not Currently    Drug use: Not Currently     Types: Marijuana     Comment: like "week and a half ago"     Review of Systems   Constitutional:  Positive for fever.   HENT:  Positive for rhinorrhea and sore throat. Negative for congestion.    Eyes:  Negative for visual disturbance.   Respiratory:  Positive for cough.    Gastrointestinal:  Positive for diarrhea, nausea and vomiting.   Genitourinary:  Negative for dysuria, flank pain, frequency and hematuria.   Musculoskeletal:  Positive for myalgias. Negative for neck pain and neck stiffness.   Skin:  Negative for rash.   Neurological:  Negative for weakness and headaches.   Psychiatric/Behavioral:  Negative for suicidal ideas.        Physical Exam     Initial Vitals [04/19/24 1655]   BP Pulse Resp Temp SpO2   124/60 73 18 99.1 °F (37.3 °C) 98 %      MAP       --         Physical Exam    Nursing note and vitals reviewed.  Constitutional: She appears well-developed and well-nourished. No distress.   HENT:   Head: Normocephalic and atraumatic.   Right Ear: External ear normal.   Left Ear: External ear normal.   Nose: Nose " normal.   Mouth/Throat: No oropharyngeal exudate.   Red oropharynx but moist   Eyes: Conjunctivae and EOM are normal. Pupils are equal, round, and reactive to light.   Neck:   Normal range of motion.  Cardiovascular:  Normal rate, regular rhythm, normal heart sounds and intact distal pulses.           No murmur heard.  Abdominal: Abdomen is soft. Bowel sounds are normal. She exhibits no distension. There is no abdominal tenderness. There is no guarding.   Musculoskeletal:         General: Normal range of motion.      Cervical back: Normal range of motion.     Lymphadenopathy:     She has no cervical adenopathy.   Neurological: She is alert and oriented to person, place, and time. She has normal strength. GCS score is 15. GCS eye subscore is 4. GCS verbal subscore is 5. GCS motor subscore is 6.   Skin: Skin is warm and dry. Capillary refill takes less than 2 seconds.   Psychiatric: She has a normal mood and affect.         Medical Screening Exam   See Full Note    ED Course   Procedures  Labs Reviewed   SARS-COV2 (COVID) W/ FLU ANTIGEN - Abnormal; Notable for the following components:       Result Value    Influenza A Positive (*)     All other components within normal limits    Narrative:     Negative SARS-CoV results should not be used as the sole basis for treatment or patient management decisions; negative results should be considered in the context of a patient's recent exposures, history and the presene of clinical signs and symptoms consistent with COVID-19.  Negative results should be treated as presumptive and confirmed by molecular assay, if necessary for patient management.   STREP A BY MOLECULAR METHOD - Normal          Imaging Results    None          Medications   ondansetron disintegrating tablet 4 mg (4 mg Oral Given 4/19/24 1740)   ibuprofen tablet 600 mg (600 mg Oral Given 4/19/24 1740)     Medical Decision Making  MDM    Patient presents for emergent evaluation of acute upper respiratory issues  that poses a threat to life and/or bodily function.    In the ED patient found to have acute influenza A.    I ordered labs and personally reviewed them.  Labs significant for influenza A.      Discharge MDM    Patient was managed in the ED with Motrin and Zofran po  The response to treatment was improved.    Patient was discharged in stable condition.  Detailed return precautions discussed.     Amount and/or Complexity of Data Reviewed  Independent Historian:      Details: 23-year old American or black female complaining of N/V, diarrhea, fever, cough, runny nose, sore throat, and body aches since this am. Oropharynx red and moist with no exudates. Denies SOB, chest pain, or abd pain. Vitals stable with no acute distress.  Labs: ordered.     Details: Covid and step swabs neg  Influenza A positive    Risk  OTC drugs.  Prescription drug management.                                      Clinical Impression:   Final diagnoses:  [J10.1] Influenza A (Primary)        ED Disposition Condition    Discharge Stable          ED Prescriptions    None       Follow-up Information    None          Chayo Moya, SARAH  04/20/24 5478

## 2024-04-19 NOTE — DISCHARGE INSTRUCTIONS
Follow up with primary care provider in 5-7 days.   Quarantine in home for 7 days from start of your symptoms.  Motrin 600 mg every 8 hours as needed for fever/pain/discomfort.   Tylenol 650 mg every 6 hours as needed for fever/pain/discomfort.    Increase fluid intake. This should include water and drinks with electrolytes such as Gatorade, Powerade, Pedialyte, etc..  Return to emergency department for any future emergencies.

## 2024-12-26 ENCOUNTER — HOSPITAL ENCOUNTER (EMERGENCY)
Facility: HOSPITAL | Age: 24
Discharge: HOME | End: 2024-12-26

## 2024-12-26 VITALS
HEIGHT: 68 IN | SYSTOLIC BLOOD PRESSURE: 112 MMHG | DIASTOLIC BLOOD PRESSURE: 68 MMHG | TEMPERATURE: 99 F | RESPIRATION RATE: 18 BRPM | OXYGEN SATURATION: 96 % | WEIGHT: 224 LBS | HEART RATE: 68 BPM | BODY MASS INDEX: 33.95 KG/M2

## 2024-12-26 DIAGNOSIS — S61.210A LACERATION OF RIGHT INDEX FINGER WITHOUT FOREIGN BODY WITHOUT DAMAGE TO NAIL, INITIAL ENCOUNTER: Primary | ICD-10-CM

## 2024-12-26 PROCEDURE — 99284 EMERGENCY DEPT VISIT MOD MDM: CPT | Mod: 25

## 2024-12-26 PROCEDURE — 63600175 PHARM REV CODE 636 W HCPCS: Performed by: NURSE PRACTITIONER

## 2024-12-26 PROCEDURE — 96372 THER/PROPH/DIAG INJ SC/IM: CPT | Performed by: NURSE PRACTITIONER

## 2024-12-26 PROCEDURE — 99284 EMERGENCY DEPT VISIT MOD MDM: CPT | Mod: ,,, | Performed by: NURSE PRACTITIONER

## 2024-12-26 RX ORDER — CEFAZOLIN SODIUM 1 G/3ML
1 INJECTION, POWDER, FOR SOLUTION INTRAMUSCULAR; INTRAVENOUS
Status: COMPLETED | OUTPATIENT
Start: 2024-12-26 | End: 2024-12-26

## 2024-12-26 RX ORDER — CEPHALEXIN 500 MG/1
500 CAPSULE ORAL EVERY 6 HOURS
Qty: 40 CAPSULE | Refills: 0 | Status: SHIPPED | OUTPATIENT
Start: 2024-12-26

## 2024-12-26 RX ADMIN — CEFAZOLIN 1 G: 330 INJECTION, POWDER, FOR SOLUTION INTRAMUSCULAR; INTRAVENOUS at 12:12

## 2024-12-26 NOTE — DISCHARGE INSTRUCTIONS
Take antibiotics as directed.  Follow up with your primary care provider in the next 2-3 days for wound recheck.  May use Tylenol and ibuprofen as needed for pain.  Return to the emergency department for any worsening of pain, swelling, redness or any concerns

## 2024-12-26 NOTE — ED PROVIDER NOTES
Encounter Date: 12/26/2024       History     Chief Complaint   Patient presents with    Laceration     Right hand base of 1st finger, yesterday before noon.     Patient presents today with complaint of laceration to her right index finger that occurred while cutting up some celery.  Injury occurred approximately 28 hours prior to arrival.  She reports waking up this morning with more tenderness and pain.  States she noticed some redness prompted her to come to the emergency department.  She denies any other contributing factors.  He has not had any fever nausea or vomiting.        Review of patient's allergies indicates:   Allergen Reactions    Aspirin Swelling    Hornet venom Swelling     History reviewed. No pertinent past medical history.  Past Surgical History:   Procedure Laterality Date    KNEE SURGERY Right      No family history on file.  Social History     Tobacco Use    Smoking status: Former     Types: Cigars    Smokeless tobacco: Never   Substance Use Topics    Alcohol use: Not Currently    Drug use: Not Currently     Types: Marijuana     Comment: last use 3 days ago     Review of Systems   Constitutional: Negative.    Respiratory: Negative.     Cardiovascular: Negative.    All other systems reviewed and are negative.      Physical Exam     Initial Vitals [12/26/24 1146]   BP Pulse Resp Temp SpO2   112/68 68 18 98.5 °F (36.9 °C) 96 %      MAP       --         Physical Exam    Nursing note and vitals reviewed.  Constitutional: She appears well-developed and well-nourished.   Cardiovascular:  Normal rate, regular rhythm and normal heart sounds.           No murmur heard.  Pulmonary/Chest: Breath sounds normal. No respiratory distress.   Musculoskeletal:         General: Normal range of motion.      Comments: Tenderness with mild swelling and erythema around a 2 cm laceration palmar aspect of right index finger, full range of motion of finger with normal strength and no signs of tendon laceration      Neurological: She is alert and oriented to person, place, and time. She has normal strength. No cranial nerve deficit.   Psychiatric: She has a normal mood and affect.         Medical Screening Exam   See Full Note    ED Course   Procedures  Labs Reviewed - No data to display       Imaging Results    None          Medications   ceFAZolin injection 1 g (has no administration in time range)     Medical Decision Making  Patient has some evidence of early infection though no tendon injury is identified.  We will treat her with Ancef 1 g IM here in the emergency department followed by Keflex 500 mg q.6 hours for 7 days and have her follow up with her primary care provider in the next 2-3 days for wound recheck.  I have encouraged her to return to the emergency department should she have any signs of increased swelling, worsening pain, migrating erythema or should she develop fever nausea or vomiting.  She verbalizes understanding of these instructions    Risk  Prescription drug management.                                      Clinical Impression:   Final diagnoses:  [S61.210A] Laceration of right index finger without foreign body without damage to nail, initial encounter (Primary)        ED Disposition Condition    Discharge Stable          ED Prescriptions       Medication Sig Dispense Start Date End Date Auth. Provider    cephALEXin (KEFLEX) 500 MG capsule Take 1 capsule (500 mg total) by mouth every 6 (six) hours. 40 capsule 12/26/2024 -- Addi Koehler NP          Follow-up Information    None          Addi Koehler NP  12/26/24 9388

## 2024-12-27 ENCOUNTER — TELEPHONE (OUTPATIENT)
Dept: EMERGENCY MEDICINE | Facility: HOSPITAL | Age: 24
End: 2024-12-27

## 2025-03-21 ENCOUNTER — CLINICAL SUPPORT (OUTPATIENT)
Dept: PRIMARY CARE CLINIC | Facility: CLINIC | Age: 25
End: 2025-03-21

## 2025-03-21 DIAGNOSIS — Z11.1 SCREENING-PULMONARY TB: Primary | ICD-10-CM

## 2025-03-21 NOTE — PROGRESS NOTES
Patient ID: Alistair Wang is a 24 y.o. female.    Chief Complaint: No chief complaint on file.    History of Present Illness              Physical Exam              Assessment & Plan               1. Screening-pulmonary TB  -     POCT TB Skin Test Read        No follow-ups on file.    This note was generated with the assistance of ambient listening technology. Verbal consent was obtained by the patient and accompanying visitor(s) for the recording of patient appointment to facilitate this note. I attest to having reviewed and edited the generated note for accuracy, though some syntax or spelling errors may persist. Please contact the author of this note for any clarification.

## 2025-03-27 ENCOUNTER — OFFICE VISIT (OUTPATIENT)
Dept: FAMILY MEDICINE | Facility: CLINIC | Age: 25
End: 2025-03-27

## 2025-03-27 VITALS
OXYGEN SATURATION: 97 % | SYSTOLIC BLOOD PRESSURE: 99 MMHG | BODY MASS INDEX: 32.71 KG/M2 | WEIGHT: 215.81 LBS | RESPIRATION RATE: 17 BRPM | TEMPERATURE: 99 F | DIASTOLIC BLOOD PRESSURE: 62 MMHG | HEIGHT: 68 IN | HEART RATE: 64 BPM

## 2025-03-27 DIAGNOSIS — M25.552 LEFT HIP PAIN: ICD-10-CM

## 2025-03-27 DIAGNOSIS — Z02.89 ENCOUNTER FOR PHYSICAL EXAMINATION RELATED TO EMPLOYMENT: Primary | ICD-10-CM

## 2025-03-27 RX ORDER — NAPROXEN 500 MG/1
500 TABLET ORAL 2 TIMES DAILY PRN
Qty: 20 TABLET | Refills: 0 | Status: SHIPPED | OUTPATIENT
Start: 2025-03-27

## 2025-03-27 NOTE — PROGRESS NOTES
"Clinic Note    Alistair Wang is a 24 y.o. female     Chief Complaint:   Chief Complaint   Patient presents with    Employment Physical        Subjective:    Patient comes in today for employment physical. Denies pertinent pmh. Denies routine medication use. Denies any restrictions or limitations.  Patient does reports left hip pain X 2 weeks. Denies injury. Worse with weight bearing. Has rom but pain with weight bearing. States she can still perform job duties.  Otherwise denies any complaints or concerns.         Allergies:   Review of patient's allergies indicates:   Allergen Reactions    Aspirin Swelling    Hornet venom Swelling        Past Medical History:  No past medical history on file.     Current Medications:  Current Medications[1]       Review of Systems   Constitutional:  Negative for fever.   Respiratory:  Negative for cough and shortness of breath.    Cardiovascular:  Negative for chest pain, palpitations and leg swelling.   Gastrointestinal:  Negative for abdominal pain, constipation, diarrhea, nausea and vomiting.   Genitourinary:  Negative for dysuria.   Musculoskeletal:  Positive for arthralgias. Negative for joint swelling.   Neurological:  Negative for headaches.          Objective:    BP 99/62 (BP Location: Left arm, Patient Position: Sitting)   Pulse 64   Temp 98.8 °F (37.1 °C) (Oral)   Resp 17   Ht 5' 8" (1.727 m)   Wt 97.9 kg (215 lb 12.8 oz)   LMP 03/24/2025   SpO2 97%   BMI 32.81 kg/m²      Physical Exam  Constitutional:       Appearance: Normal appearance.   Eyes:      Extraocular Movements: Extraocular movements intact.   Cardiovascular:      Rate and Rhythm: Normal rate and regular rhythm.      Pulses: Normal pulses.      Heart sounds: Normal heart sounds.   Pulmonary:      Effort: Pulmonary effort is normal.      Breath sounds: Normal breath sounds.   Abdominal:      Palpations: Abdomen is soft.      Tenderness: There is no abdominal tenderness. There is no guarding or " rebound.   Musculoskeletal:      Right hip: No tenderness or bony tenderness.      Left hip: No tenderness or bony tenderness. Decreased range of motion.      Right lower leg: No edema.      Left lower leg: No edema.      Comments:  pain with weight bearing   Skin:     General: Skin is warm and dry.   Neurological:      Mental Status: She is alert and oriented to person, place, and time.   Psychiatric:         Mood and Affect: Mood normal.          Assessment and Plan:    1. Encounter for physical examination related to employment    2. Left hip pain         Encounter for physical examination related to employment  -employment physical form completed and signed  -copy scanned to chart  -still able to stoop and bend with hip pain    Left hip pain  -     naproxen (NAPROSYN) 500 MG tablet; Take 1 tablet (500 mg total) by mouth 2 (two) times daily as needed.  Dispense: 20 tablet; Refill: 0  -ice to hip prn  -f/u if persist or worsen  -has tolerated ibuprofen and advil without adverse side effects      There are no Patient Instructions on file for this visit.   Follow up if symptoms worsen or fail to improve.          [1]   Current Outpatient Medications:     naproxen (NAPROSYN) 500 MG tablet, Take 1 tablet (500 mg total) by mouth 2 (two) times daily as needed., Disp: 20 tablet, Rfl: 0